# Patient Record
Sex: FEMALE | URBAN - METROPOLITAN AREA
[De-identification: names, ages, dates, MRNs, and addresses within clinical notes are randomized per-mention and may not be internally consistent; named-entity substitution may affect disease eponyms.]

---

## 2022-10-06 ENCOUNTER — APPOINTMENT (RX ONLY)
Dept: URBAN - METROPOLITAN AREA CLINIC 151 | Facility: CLINIC | Age: 42
Setting detail: DERMATOLOGY
End: 2022-10-06

## 2022-10-06 DIAGNOSIS — L30.8 OTHER SPECIFIED DERMATITIS: ICD-10-CM

## 2022-10-06 PROCEDURE — ? DIAGNOSIS COMMENT

## 2022-10-06 PROCEDURE — ? KOH PREP

## 2022-10-06 PROCEDURE — ? COUNSELING

## 2022-10-06 PROCEDURE — ? PRESCRIPTION

## 2022-10-06 PROCEDURE — 99204 OFFICE O/P NEW MOD 45 MIN: CPT

## 2022-10-06 RX ORDER — CLOBETASOL PROPIONATE 0.5 MG/G
OINTMENT TOPICAL
Qty: 60 | Refills: 1 | Status: ERX | COMMUNITY
Start: 2022-10-06

## 2022-10-06 RX ADMIN — CLOBETASOL PROPIONATE: 0.5 OINTMENT TOPICAL at 00:00

## 2022-10-06 ASSESSMENT — LOCATION ZONE DERM: LOCATION ZONE: HAND

## 2022-10-06 ASSESSMENT — LOCATION SIMPLE DESCRIPTION DERM: LOCATION SIMPLE: RIGHT HAND

## 2022-10-06 ASSESSMENT — LOCATION DETAILED DESCRIPTION DERM: LOCATION DETAILED: RIGHT RADIAL DORSAL HAND

## 2022-10-06 NOTE — HPI: OTHER
Condition:: Rash on b/l hands
Please Describe Your Condition:: is a new patient being seen for a chief complaint of rash on b/l hands. Ongoing for last two years. 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 Has received antifungal treatment including oral Terbinafine 250 mg x 14 days (pt reports no change/improvement in rash during this course), topical ketoconazole 2% cream (made rash worse), and currently using Lotrimin ultra clear cream. Very pruritic. Not currently moisturizing. Denies personal or family history of eczema or psoriasis.

## 2022-10-06 NOTE — PROCEDURE: DIAGNOSIS COMMENT
Comment: Chronic: ongoing for 2 years without improvement with oral Terbinafine 250 mg x 2 weeks, topical ketoconazole, and Lotrimin. Favor hand eczema based on presentation today. Considered psoriasis in differential, but clinical presentation make this less likely and pt denies involvement on feet, nails wnl, and no history of joint pain. KOH negative. Will treat with topical clobetasol and moisturizers as below (discussed use of cotton gloves over top medication and moisturizer at night) with close follow-up in 3 weeks. Pt instructed to reach out to office sooner if concerns or worsening.
Render Risk Assessment In Note?: no
Detail Level: Detailed

## 2022-10-06 NOTE — PROCEDURE: MIPS QUALITY
Quality 226: Preventive Care And Screening: Tobacco Use: Screening And Cessation Intervention: Patient screened for tobacco use and is an ex/non-smoker
Detail Level: Detailed
Quality 431: Preventive Care And Screening: Unhealthy Alcohol Use - Screening: Patient not identified as an unhealthy alcohol user when screened for unhealthy alcohol use using a systematic screening method
Quality 130: Documentation Of Current Medications In The Medical Record: Current Medications Documented
Quality 47: Advance Care Plan: Advance care planning not documented, reason not otherwise specified.
Quality 110: Preventive Care And Screening: Influenza Immunization: Influenza Immunization Administered during Influenza season

## 2022-10-27 ENCOUNTER — APPOINTMENT (RX ONLY)
Dept: URBAN - METROPOLITAN AREA CLINIC 151 | Facility: CLINIC | Age: 42
Setting detail: DERMATOLOGY
End: 2022-10-27

## 2022-10-27 DIAGNOSIS — L30.8 OTHER SPECIFIED DERMATITIS: ICD-10-CM | Status: IMPROVED

## 2022-10-27 PROCEDURE — ? COUNSELING

## 2022-10-27 PROCEDURE — 99213 OFFICE O/P EST LOW 20 MIN: CPT

## 2022-10-27 PROCEDURE — ? DIAGNOSIS COMMENT

## 2022-10-27 NOTE — PROCEDURE: DIAGNOSIS COMMENT
Comment: Significantly improved on exam today. Discussed use of emollient several times a day, especially after each hand washing. Samples provided for Neutrogena [place] Hand cream. Will continue to use topical Clobetasol ointment 2x daily to areas of rash if needed for up to 2 weeks. Side effects of topical steroids reviewed as below. Will follow up annually for refills or sooner if concerns.
Render Risk Assessment In Note?: no
Detail Level: Detailed

## 2023-11-30 ENCOUNTER — RX ONLY (OUTPATIENT)
Age: 43
Setting detail: RX ONLY
End: 2023-11-30

## 2023-11-30 RX ORDER — CLOBETASOL PROPIONATE 0.5 MG/G
OINTMENT TOPICAL
Qty: 60 | Refills: 0 | Status: ERX

## 2023-12-07 ENCOUNTER — RX ONLY (OUTPATIENT)
Age: 43
Setting detail: RX ONLY
End: 2023-12-07

## 2023-12-07 ENCOUNTER — APPOINTMENT (RX ONLY)
Dept: URBAN - METROPOLITAN AREA CLINIC 151 | Facility: CLINIC | Age: 43
Setting detail: DERMATOLOGY
End: 2023-12-07

## 2023-12-07 DIAGNOSIS — L30.8 OTHER SPECIFIED DERMATITIS: ICD-10-CM

## 2023-12-07 PROCEDURE — 99213 OFFICE O/P EST LOW 20 MIN: CPT

## 2023-12-07 PROCEDURE — ? COUNSELING

## 2023-12-07 PROCEDURE — ? DIAGNOSIS COMMENT

## 2023-12-07 RX ORDER — CLOBETASOL PROPIONATE 0.5 MG/G
OINTMENT TOPICAL
Qty: 60 | Refills: 6 | Status: ERX

## 2023-12-07 NOTE — PROCEDURE: DIAGNOSIS COMMENT
Comment: Clear on exam today. Discussed use of emollient several times a day, especially after each hand washing. Will continue to use topical Clobetasol ointment 2x daily to areas of rash if needed for up to 2 weeks. Side effects of topical steroids reviewed as below. Will follow up annually for refills or sooner if concerns.
Render Risk Assessment In Note?: no
Detail Level: Detailed